# Patient Record
Sex: FEMALE | Race: WHITE | NOT HISPANIC OR LATINO | Employment: FULL TIME | URBAN - METROPOLITAN AREA
[De-identification: names, ages, dates, MRNs, and addresses within clinical notes are randomized per-mention and may not be internally consistent; named-entity substitution may affect disease eponyms.]

---

## 2024-07-02 ENCOUNTER — OFFICE VISIT (OUTPATIENT)
Dept: OBSTETRICS AND GYNECOLOGY | Facility: CLINIC | Age: 34
End: 2024-07-02
Payer: COMMERCIAL

## 2024-07-02 VITALS — WEIGHT: 133 LBS | HEART RATE: 71 BPM | SYSTOLIC BLOOD PRESSURE: 106 MMHG | DIASTOLIC BLOOD PRESSURE: 65 MMHG

## 2024-07-02 DIAGNOSIS — Z11.3 SCREEN FOR STD (SEXUALLY TRANSMITTED DISEASE): Primary | ICD-10-CM

## 2024-07-02 DIAGNOSIS — N89.8 VAGINA ITCHING: ICD-10-CM

## 2024-07-02 RX ORDER — LEVONORGESTREL 19.5 MG/1
1 INTRAUTERINE DEVICE INTRAUTERINE ONCE
COMMUNITY

## 2024-07-02 RX ORDER — DOXYCYCLINE HYCLATE 100 MG
100 TABLET ORAL 2 TIMES DAILY
Qty: 14 TABLET | Refills: 0 | Status: SHIPPED | OUTPATIENT
Start: 2024-07-02 | End: 2024-07-16

## 2024-07-02 NOTE — PROGRESS NOTES
S a 34-year-old female  0 who had an IUD put in 3 months ago had almost 3 months of bleeding till does not feel good and has irritability in her vagina she does not feel she had been exposed STDs she was the IUD put in was very painful she has a Anuradha.  She said she could not tolerate birth control pills felt real bad on she thinks she possibly has a intolerance to progesterone.  Discussed all kind of possibilities she has not 100% sure she would then want any children if she came to that conclusion of the tubal would be an excellent choice for now we are going to do a 1 culture put her on doxycycline 100 b.i.d. on examination vulva is normal is a very is a somewhat difficult exam due to the vagina being fairly tight does have some old blood in the vagina cultures were taken bimanual was essentially normal did spend least 30 minutes with the patient face-to-face

## 2024-07-05 LAB
BACTERIAL VAGINOSIS: POSITIVE
CANDIDA GLABRATA: NEGATIVE
CANDIDA SPECIES: NEGATIVE
CHLAMYDIA: NEGATIVE
GONORRHEA: NEGATIVE
SOURCE: NORMAL
TRICHOMONAS VAGINALIS: NEGATIVE

## 2024-07-08 DIAGNOSIS — B96.89 BACTERIAL VAGINOSIS: Primary | ICD-10-CM

## 2024-07-08 DIAGNOSIS — N76.0 BACTERIAL VAGINOSIS: Primary | ICD-10-CM

## 2024-07-08 RX ORDER — METRONIDAZOLE 500 MG/1
500 TABLET ORAL 2 TIMES DAILY
Qty: 14 TABLET | Refills: 0 | Status: CANCELLED | OUTPATIENT
Start: 2024-07-08 | End: 2024-07-15

## 2024-07-08 NOTE — TELEPHONE ENCOUNTER
Pt took doxycycline, states symptoms have worsened. Pt positive for BV, flagyl abx sent to pharmacy. Advised to stop doxycyline. Pt v/u.

## 2024-07-08 NOTE — TELEPHONE ENCOUNTER
----- Message from Caitlin Espinoza sent at 7/8/2024  9:23 AM CDT -----  Type:  Needs Medical Advice    Who Called: Stefanie Loomis    Symptoms (please be specific): -   How long has patient had these symptoms:  -  Pharmacy name and phone #:  -  Would the patient rather a call back or a response via [facility]?    Best Call Back Number: [phone]    Additional Information: pt needs to discuss results w/ Dr Costa when he is available ( pt says her symptoms have gotten worse and pharmacy only gave her 1 wk of the medication

## 2024-07-17 ENCOUNTER — TELEPHONE (OUTPATIENT)
Dept: OBSTETRICS AND GYNECOLOGY | Facility: CLINIC | Age: 34
End: 2024-07-17
Payer: COMMERCIAL

## 2024-07-17 DIAGNOSIS — B37.9 YEAST INFECTION: Primary | ICD-10-CM

## 2024-07-17 RX ORDER — FLUCONAZOLE 150 MG/1
150 TABLET ORAL DAILY
Qty: 2 TABLET | Refills: 0 | Status: SHIPPED | OUTPATIENT
Start: 2024-07-17 | End: 2024-07-19

## 2024-07-17 NOTE — TELEPHONE ENCOUNTER
Patient scheduled with  for 7/18/2024 @ 9:00am. Pt v/u      ----- Message from Jenny Yuen sent at 7/17/2024  8:05 AM CDT -----  Contact: Stefanie  Type:  Same Day Appointment Request    Caller is requesting a same day appointment.      Name of Caller:Stefanie  When is the first available appointment?7/22/24  Symptoms:discharge/symptoms returning/antibiotics  Best Call Back Number:[phone]   Additional Information: Patient request to be seen today. Please give patient an immediate call back to assist.    Type:  Needs Medical Advice    Who Called: Stefanie  Symptoms (please be specific): Discharge   How long has patient had these symptoms:  This morning  Pharmacy name and phone #:    [id number] - [place], [place] - [address] [address]  Phone: [phone] Fax: [phone]  Would the patient rather a call back or a response via [facility]? call  Best Call Back Number: [phone]   Additional Information: Patient reports symptoms returned this morning and request to receive antibiotics today.     Thank you,  GH

## 2024-07-17 NOTE — TELEPHONE ENCOUNTER
----- Message from Theron Portillo sent at 7/17/2024  8:19 AM CDT -----  Contact: Stefanie  Mandy called in she has an appointment scheduled for tomorrow 7/18/24 however, she is asking to be seen today 7/17/24. She is asking for a call back [phone]

## 2024-07-18 ENCOUNTER — OFFICE VISIT (OUTPATIENT)
Dept: OBSTETRICS AND GYNECOLOGY | Facility: CLINIC | Age: 34
End: 2024-07-18
Payer: COMMERCIAL

## 2024-07-18 VITALS — DIASTOLIC BLOOD PRESSURE: 76 MMHG | SYSTOLIC BLOOD PRESSURE: 124 MMHG | HEART RATE: 77 BPM | WEIGHT: 132.94 LBS

## 2024-07-18 DIAGNOSIS — N76.0 BACTERIAL VAGINOSIS: ICD-10-CM

## 2024-07-18 DIAGNOSIS — T83.9XXD COMPLICATION OF INTRAUTERINE DEVICE (IUD), UNSPECIFIED COMPLICATION, SUBSEQUENT ENCOUNTER: Primary | ICD-10-CM

## 2024-07-18 DIAGNOSIS — B96.89 BACTERIAL VAGINOSIS: ICD-10-CM

## 2024-07-18 PROCEDURE — 3074F SYST BP LT 130 MM HG: CPT | Mod: CPTII,S$GLB,, | Performed by: OBSTETRICS & GYNECOLOGY

## 2024-07-18 PROCEDURE — 99213 OFFICE O/P EST LOW 20 MIN: CPT | Mod: 25,S$GLB,, | Performed by: OBSTETRICS & GYNECOLOGY

## 2024-07-18 PROCEDURE — 58301 REMOVE INTRAUTERINE DEVICE: CPT | Mod: S$GLB,,, | Performed by: OBSTETRICS & GYNECOLOGY

## 2024-07-18 PROCEDURE — 3078F DIAST BP <80 MM HG: CPT | Mod: CPTII,S$GLB,, | Performed by: OBSTETRICS & GYNECOLOGY

## 2024-07-18 RX ORDER — METRONIDAZOLE 500 MG/1
500 TABLET ORAL 2 TIMES DAILY
Qty: 14 TABLET | Refills: 0 | Status: SHIPPED | OUTPATIENT
Start: 2024-07-18 | End: 2024-08-01

## 2024-07-18 NOTE — PROGRESS NOTES
S a 34-year-old female who had an IUD put in several months ago ever since she had the IUD in she has had multiple problems different discharge is watery discharge foul-smelling discharge pain with the IUD will treat her with doxycycline cephalexin, which has helped now she is having more of a watery discharge I advised her that maybe she just is allergic to something in the IUD therefore the IUD was removed she had moderate cramping afterwards she did real well with metronidazole so will try her on that for little while and do a 1 culture the IUD was removed intact there was no odor watery discharge or discharge from the uterus.

## 2024-07-18 NOTE — PROCEDURES
Removal of IUD    Date/Time: 7/18/2024 1:00 PM    Performed by: Luther Costa MD  Authorized by: Luther Costa MD    Consent given by:  Patient  Procedure risks and benefits discussed: yes    Patient questions answered: yes    Patient agrees, verbalizes understanding, and wants to proceed: yes    Educational handouts given: no    Instructions and paperwork completed: yes    Implant grasped by: forceps  Removal due to infection and inflammatory reaction: yes    Removal due to mechanical complications of Nexplanon: yes    Removed with no complications: yes     IUD removed intact shown the patientno

## 2024-07-25 ENCOUNTER — TELEPHONE (OUTPATIENT)
Dept: OBSTETRICS AND GYNECOLOGY | Facility: CLINIC | Age: 34
End: 2024-07-25
Payer: COMMERCIAL

## 2024-07-25 NOTE — TELEPHONE ENCOUNTER
----- Message from Chayo Wilson sent at 7/24/2024  4:08 PM CDT -----  Contact: GAGANDEEP GUTIERREZ [[id number]]  ..Type:  Patient Requesting Call    Who Called: GAGANDEEP GUTIERREZ [[id number]]  Does the patient know what this is regarding?: 7/19 test results  Would the patient rather a call back or a response via [facility]?  call  Best Call Back Number: .[phone] (home)   Additional Information:

## 2024-07-29 ENCOUNTER — DOCUMENTATION ONLY (OUTPATIENT)
Dept: OBSTETRICS AND GYNECOLOGY | Facility: CLINIC | Age: 34
End: 2024-07-29
Payer: COMMERCIAL

## 2024-07-29 NOTE — PROGRESS NOTES
Discussed results of the 1 swab with the patient she had an IUD that was removed we did grow out mycoplasma and the recommendation she was already treated with doxycycline 100 b.i.d. p.o. b.i.d. for 7 days and will also treat her with the low lactobacilli protocol with vaginal on oral probiotics

## 2024-08-19 ENCOUNTER — TELEPHONE (OUTPATIENT)
Dept: OBSTETRICS AND GYNECOLOGY | Facility: CLINIC | Age: 34
End: 2024-08-19
Payer: COMMERCIAL

## 2024-08-19 ENCOUNTER — PATIENT MESSAGE (OUTPATIENT)
Dept: OBSTETRICS AND GYNECOLOGY | Facility: CLINIC | Age: 34
End: 2024-08-19
Payer: COMMERCIAL

## 2024-08-19 NOTE — TELEPHONE ENCOUNTER
Patient reports she is not feeling well after her IUD was removed. Patient is requesting blood work. Informed patient Dr. Costa is not here today. Informed patient if she needs to be evaluated to report to urgent care or the ER. Verbalized understanding.

## 2024-08-19 NOTE — TELEPHONE ENCOUNTER
----- Message from Thiago Talamantes sent at 8/19/2024 11:50 AM CDT -----  Contact: [phone]  Type:  Same Day Appointment Request    Caller is requesting a same day appointment.  Caller declined first available appointment listed below.    Name of Caller:Stefanie   When is the first available appointment?08/21/2024  Symptoms:feeling ill since birth control was removed   Best Call Back Number:[phone]   Additional Information: pt is requesting to be seen today

## 2024-08-21 ENCOUNTER — OFFICE VISIT (OUTPATIENT)
Dept: OBSTETRICS AND GYNECOLOGY | Facility: CLINIC | Age: 34
End: 2024-08-21
Payer: COMMERCIAL

## 2024-08-21 VITALS — WEIGHT: 141 LBS | HEART RATE: 83 BPM | DIASTOLIC BLOOD PRESSURE: 77 MMHG | SYSTOLIC BLOOD PRESSURE: 121 MMHG

## 2024-08-21 DIAGNOSIS — Z87.19 H/O DIVERTICULITIS OF COLON: ICD-10-CM

## 2024-08-21 DIAGNOSIS — N72 CERVICITIS: Primary | ICD-10-CM

## 2024-08-21 DIAGNOSIS — R58 BLEEDING: ICD-10-CM

## 2024-08-21 DIAGNOSIS — R10.9 ABDOMINAL PAIN, UNSPECIFIED ABDOMINAL LOCATION: ICD-10-CM

## 2024-08-21 LAB
ABS NRBC COUNT: 0 X 10 3/UL (ref 0–0.01)
ABSOLUTE BASOPHIL: 0.05 X 10 3/UL (ref 0–0.22)
ABSOLUTE EOSINOPHIL: 0.1 X 10 3/UL (ref 0.04–0.54)
ABSOLUTE IMMATURE GRAN: 0.02 X 10 3/UL (ref 0–0.04)
ABSOLUTE LYMPHOCYTE: 2.51 X 10 3/UL (ref 0.86–4.75)
ABSOLUTE MONOCYTE: 0.7 X 10 3/UL (ref 0.22–1.08)
BASOPHILS NFR BLD: 0.9 % (ref 0.2–1.2)
EOSINOPHIL NFR BLD: 1.7 % (ref 0.7–7)
HCT VFR BLD AUTO: 39.8 % (ref 37–47)
HGB BLD-MCNC: 13.4 G/DL (ref 12–16)
IMMATURE GRANULOCYTES: 0.3 % (ref 0–0.5)
LYMPHOCYTES NFR BLD: 42.8 % (ref 19.3–53.1)
MCH RBC QN AUTO: 32.4 PG (ref 27–32)
MCHC RBC AUTO-ENTMCNC: 33.7 G/DL (ref 32–36)
MCV RBC AUTO: 96.1 FL (ref 82–100)
MONOCYTES NFR BLD: 11.9 % (ref 4.7–12.5)
NEUTROPHILS # BLD AUTO: 2.48 X 10 3/UL (ref 2.15–7.56)
NEUTROPHILS NFR BLD: 42.4 % (ref 34–71.1)
NUCLEATED RED BLOOD CELLS: 0 /100 WBC (ref 0–0.2)
PLATELET # BLD AUTO: 363 X 10 3/UL (ref 135–400)
RBC # BLD AUTO: 4.14 X 10 6/UL (ref 4.2–5.4)
RDW-SD: 47 FL (ref 37–54)
WBC # BLD: 5.86 X 10 3/UL (ref 4.3–10.8)

## 2024-08-21 NOTE — PROGRESS NOTES
Is a 34-year-old female who is feeling bad having some bloating and pain was sure whether was IUD is a lady so the IUD is removed subsequently lot of diarrhea abdominal pain she does have a history diverticulitis had put her on the doxycycline on exam today her abdomen not bloating at this time pelvic and is no discharge culture was taken some bimanual examination uterus is not tender X is not tender clinical does not feel like salpingitis her endometritis sounds like some type Rosmery bowel diverticulitis see if I get her in the see gastroenterologist I will do a CBC and a culture see if we to.  The patient had pregnancy test which was negative

## 2024-08-22 DIAGNOSIS — Z87.19 HX OF DIVERTICULITIS OF COLON: Primary | ICD-10-CM

## 2024-08-23 LAB — CULTURE: NORMAL

## 2024-09-10 ENCOUNTER — TELEPHONE (OUTPATIENT)
Dept: OBSTETRICS AND GYNECOLOGY | Facility: CLINIC | Age: 34
End: 2024-09-10
Payer: COMMERCIAL

## 2024-09-10 NOTE — TELEPHONE ENCOUNTER
Called patient back to follow up with her after her ER visit. She is going to see a GI physician for her abdominal pain, she is not currently having any GYN issues and will call if she does.           Lien

## 2024-11-01 ENCOUNTER — TELEPHONE (OUTPATIENT)
Dept: OBSTETRICS AND GYNECOLOGY | Facility: CLINIC | Age: 34
End: 2024-11-01
Payer: COMMERCIAL

## 2024-11-04 DIAGNOSIS — Z30.9 ENCOUNTER FOR CONTRACEPTIVE MANAGEMENT, UNSPECIFIED TYPE: Primary | ICD-10-CM

## 2024-11-05 RX ORDER — NORETHINDRONE ACETATE AND ETHINYL ESTRADIOL, ETHINYL ESTRADIOL AND FERROUS FUMARATE 1MG-10(24)
1 KIT ORAL DAILY
Qty: 90 TABLET | Refills: 3 | Status: SHIPPED | OUTPATIENT
Start: 2024-11-05 | End: 2025-11-05

## 2024-11-09 ENCOUNTER — OUTSIDE PLACE OF SERVICE (OUTPATIENT)
Dept: GASTROENTEROLOGY | Facility: CLINIC | Age: 34
End: 2024-11-09
Payer: COMMERCIAL

## 2024-11-10 ENCOUNTER — OUTSIDE PLACE OF SERVICE (OUTPATIENT)
Dept: GASTROENTEROLOGY | Facility: CLINIC | Age: 34
End: 2024-11-10
Payer: COMMERCIAL

## 2024-12-23 ENCOUNTER — TELEPHONE (OUTPATIENT)
Dept: GASTROENTEROLOGY | Facility: CLINIC | Age: 34
End: 2024-12-23
Payer: COMMERCIAL

## 2025-02-26 ENCOUNTER — TELEPHONE (OUTPATIENT)
Dept: GASTROENTEROLOGY | Facility: CLINIC | Age: 35
End: 2025-02-26
Payer: COMMERCIAL

## 2025-02-26 NOTE — TELEPHONE ENCOUNTER
----- Message from Gifty sent at 2/26/2025  1:33 PM CST -----  Contact: GAGANDEEP GUTIERREZ [[id number]]  ...Type:  Patient Requesting OrdersWho Called: GAGANDEEP GUTIERREZ [[id number]]What order(s)is the call regarding?: the pt needs the results faxed over to there pcp of the gastric emptying. Dr. Ward office number: [phone].Would the patient rather a call back or a response via [facility]?  [facility] Call Back Number: [phone] (home) Additional Information:

## 2025-02-26 NOTE — TELEPHONE ENCOUNTER
S/W pt she is wanting her gastric emptying results sent to Dr. Ward office number: [phone]. Informed pt that we have not received the results from her gastric emptying study and will give her a call when it is received and we will fax it over to her requested provider. -MARII

## 2025-03-03 ENCOUNTER — TELEPHONE (OUTPATIENT)
Dept: GASTROENTEROLOGY | Facility: CLINIC | Age: 35
End: 2025-03-03
Payer: COMMERCIAL

## 2025-03-03 NOTE — TELEPHONE ENCOUNTER
See notes from 3/3/2025 and 2/26/2025 Telephone encounters. This is a patient of Dr. Dobson's (I only saw her as a consultant when she was in the hospital). I did not order a GES which is why we are not contacting her with those results. She needs to reach out to the provider who ordered the GES.  AGNES

## 2025-03-03 NOTE — TELEPHONE ENCOUNTER
----- Message from Theron sent at 3/3/2025  2:40 PM CST -----  Contact: Stefanie  Type:  Test ResultsWho Called: Stefanie Name of Test (Lab/Mammo/Etc): Gastric Emptying Date of Test: 2/12/25Ordering Provider:  Where the test was performed: [facility] Would the patient rather a call back or a response via [facility]? Call back Best Call Back Number: [phone] Information:  Pt states that she have called in before about her results.

## 2025-03-03 NOTE — TELEPHONE ENCOUNTER
Called patient and let her know her gastro emptying results were not in yet. She was not happy, I told her I would call her when results are in. -ABO

## 2025-03-04 NOTE — TELEPHONE ENCOUNTER
Called patient to inform her per NBP that she did not order the GES and only saw patient as consult in the hospital and she needs to reach out to the provider who ordered the GES. Patient verbalized understanding of this information. -KNC,LPN

## 2025-05-01 ENCOUNTER — TELEPHONE (OUTPATIENT)
Dept: OBSTETRICS AND GYNECOLOGY | Facility: CLINIC | Age: 35
End: 2025-05-01
Payer: COMMERCIAL

## 2025-05-01 NOTE — TELEPHONE ENCOUNTER
Returned patient's call she is wanting to get allergy testing done asked if the provider does this I explained to her that she would have to go to an allergist or ENT. For medication, food and environmental.       Shantell-CMA

## 2025-05-01 NOTE — TELEPHONE ENCOUNTER
----- Message from Caitlin sent at 5/1/2025  8:21 AM CDT -----  Type:  Needs Medical AdviceWho Called: Stefanie Zelaya (please be specific): - How long has patient had these symptoms:  -Pharmacy name and phone #:  -Would the patient rather a call back or a response via [facility]? Be Call Back Number: [phone] Information: pt wants to know if   does allergy testing, please advise

## 2025-05-06 ENCOUNTER — OFFICE VISIT (OUTPATIENT)
Dept: OBSTETRICS AND GYNECOLOGY | Facility: CLINIC | Age: 35
End: 2025-05-06
Payer: COMMERCIAL

## 2025-05-06 VITALS
HEART RATE: 87 BPM | HEIGHT: 66 IN | SYSTOLIC BLOOD PRESSURE: 132 MMHG | BODY MASS INDEX: 20.76 KG/M2 | DIASTOLIC BLOOD PRESSURE: 64 MMHG | WEIGHT: 129.19 LBS

## 2025-05-06 DIAGNOSIS — N89.8 VAGINA ITCHING: ICD-10-CM

## 2025-05-06 DIAGNOSIS — G47.00 INSOMNIA, UNSPECIFIED TYPE: Primary | ICD-10-CM

## 2025-05-06 PROCEDURE — 99213 OFFICE O/P EST LOW 20 MIN: CPT | Mod: S$PBB,,, | Performed by: OBSTETRICS & GYNECOLOGY

## 2025-05-06 PROCEDURE — 3075F SYST BP GE 130 - 139MM HG: CPT | Mod: CPTII,,, | Performed by: OBSTETRICS & GYNECOLOGY

## 2025-05-06 PROCEDURE — 3008F BODY MASS INDEX DOCD: CPT | Mod: CPTII,,, | Performed by: OBSTETRICS & GYNECOLOGY

## 2025-05-06 PROCEDURE — 3078F DIAST BP <80 MM HG: CPT | Mod: CPTII,,, | Performed by: OBSTETRICS & GYNECOLOGY

## 2025-05-06 PROCEDURE — 1159F MED LIST DOCD IN RCRD: CPT | Mod: CPTII,,, | Performed by: OBSTETRICS & GYNECOLOGY

## 2025-05-06 RX ORDER — NYSTATIN AND TRIAMCINOLONE ACETONIDE 100000; 1 [USP'U]/G; MG/G
CREAM TOPICAL
Qty: 30 G | Refills: 1 | Status: SHIPPED | OUTPATIENT
Start: 2025-05-06 | End: 2026-05-06

## 2025-05-06 RX ORDER — ZOLPIDEM TARTRATE 5 MG/1
5 TABLET ORAL NIGHTLY PRN
Qty: 30 TABLET | Refills: 4 | Status: SHIPPED | OUTPATIENT
Start: 2025-05-06 | End: 2025-11-04

## 2025-05-06 RX ORDER — NYSTATIN AND TRIAMCINOLONE ACETONIDE 100000; 1 [USP'U]/G; MG/G
CREAM TOPICAL
Qty: 30 G | Refills: 1 | Status: SHIPPED | OUTPATIENT
Start: 2025-05-06 | End: 2025-05-06

## 2025-05-06 RX ORDER — DROSPIRENONE AND ETHINYL ESTRADIOL 0.03MG-3MG
1 KIT ORAL DAILY
Qty: 28 TABLET | Refills: 11 | Status: SHIPPED | OUTPATIENT
Start: 2025-05-06 | End: 2025-05-06

## 2025-05-06 RX ORDER — NORETHINDRONE ACETATE AND ETHINYL ESTRADIOL 1MG-20(21)
1 KIT ORAL DAILY
Qty: 30 TABLET | Refills: 11 | Status: SHIPPED | OUTPATIENT
Start: 2025-05-06 | End: 2026-05-06

## 2025-05-06 NOTE — PROGRESS NOTES
S a 35-year-old female that has heavy periods also has a periods every 21 days.  She has an appointment with allergist to see if she is allergic to her hormones she took birth control pill before and apparently had a reaction to it was going to try a natural progesterone pill vital off natural progesterone is any birth control pill therefore and will try her on Loestrin periods she has a an IUD which was removed due to problems she has some vaginal itching at this time on examination the vulva has a few little tiny red areas could be just irritation from her underwear periods vaginal examination is a little bit dry does have little white discharge 1 swab was taken put her on some Mycolog for the itching and will try her on Loestrin again

## 2025-05-09 ENCOUNTER — TELEPHONE (OUTPATIENT)
Dept: OBSTETRICS AND GYNECOLOGY | Facility: CLINIC | Age: 35
End: 2025-05-09
Payer: COMMERCIAL

## 2025-05-09 NOTE — TELEPHONE ENCOUNTER
----- Message from Brigitte sent at 5/9/2025  1:07 PM CDT -----  Regarding: Results  Contact: patient  Type:  Patient Returning CallWho Called:Stefanie Who Left Message for Patient: Stefanie Does the patient know what this is regarding?: results for swap test Would the patient rather a call back or a response via [facility]?  Call back Best Call Back Number: [phone] (home) Additional Information: Please send to [facility] and rk the the AD Shelby

## 2025-05-12 ENCOUNTER — TELEPHONE (OUTPATIENT)
Dept: OBSTETRICS AND GYNECOLOGY | Facility: CLINIC | Age: 35
End: 2025-05-12
Payer: COMMERCIAL

## 2025-05-12 NOTE — TELEPHONE ENCOUNTER
----- Message from Shamika sent at 5/12/2025 12:55 PM CDT -----  Contact: self  Patient is requesting a call back regarding results. Please call back at [phone]

## 2025-05-12 NOTE — TELEPHONE ENCOUNTER
Spoke to pt an let her know that the results wasn't in yet an we would contact her when they would be. She stated that the cream he prescribed her is making it worse an her symptoms are burning sensation , I told her if it was making it worse don't continue to use it. Dr. KENDALL ren his nurse will be in tomorrow an will call her.

## 2025-05-13 ENCOUNTER — OFFICE VISIT (OUTPATIENT)
Dept: OBSTETRICS AND GYNECOLOGY | Facility: CLINIC | Age: 35
End: 2025-05-13
Payer: COMMERCIAL

## 2025-05-13 VITALS — BODY MASS INDEX: 20.85 KG/M2 | HEIGHT: 66 IN

## 2025-05-13 DIAGNOSIS — N89.8 LEUKORRHEA: ICD-10-CM

## 2025-05-13 DIAGNOSIS — N89.8 VAGINA ITCHING: Primary | ICD-10-CM

## 2025-05-13 PROCEDURE — 99212 OFFICE O/P EST SF 10 MIN: CPT | Mod: S$PBB,,, | Performed by: OBSTETRICS & GYNECOLOGY

## 2025-05-13 PROCEDURE — 3008F BODY MASS INDEX DOCD: CPT | Mod: CPTII,,, | Performed by: OBSTETRICS & GYNECOLOGY

## 2025-05-13 PROCEDURE — 1159F MED LIST DOCD IN RCRD: CPT | Mod: CPTII,,, | Performed by: OBSTETRICS & GYNECOLOGY

## 2025-05-13 RX ORDER — TERCONAZOLE 4 MG/G
CREAM VAGINAL
COMMUNITY
Start: 2025-01-25

## 2025-05-13 RX ORDER — PROGESTERONE 100 MG/1
CAPSULE ORAL
COMMUNITY
Start: 2025-03-11

## 2025-05-13 RX ORDER — LEVOTHYROXINE, LIOTHYRONINE 19; 4.5 UG/1; UG/1
TABLET ORAL
COMMUNITY
Start: 2025-02-19

## 2025-05-13 RX ORDER — TRAMADOL HYDROCHLORIDE 50 MG/1
TABLET, FILM COATED ORAL
COMMUNITY
Start: 2025-01-31

## 2025-05-13 RX ORDER — NORETHINDRONE ACETATE AND ETHINYL ESTRADIOL 1MG-20(21)
1 KIT ORAL DAILY
COMMUNITY
Start: 2024-11-14

## 2025-05-13 RX ORDER — NORETHINDRONE ACETATE AND ETHINYL ESTRADIOL, ETHINYL ESTRADIOL AND FERROUS FUMARATE 1MG-10(24)
KIT ORAL
COMMUNITY
Start: 2024-10-29

## 2025-05-13 RX ORDER — SUCRALFATE 1 G/10ML
SUSPENSION ORAL
COMMUNITY
Start: 2024-11-27

## 2025-05-13 NOTE — PROGRESS NOTES
S a 35-year-old female who has a chronic discharge with some itching the tried multiple regimens all her she states she would get back on birth control pills have irregular bleeding and it was trigger the infection and she get off of them.  She states this all began after COVID vaccine periods a recently did a 1 swab on her recommendations due to the fact that there was no BV associated over lactobacilli detected that could be a poor sample so re-did the pH today and that was right at 3.6 which could lactobacillus overgrowth therefore were going to try baking soda vaginal suppositories 125 mg once a night

## 2025-05-21 ENCOUNTER — TELEPHONE (OUTPATIENT)
Dept: OBSTETRICS AND GYNECOLOGY | Facility: CLINIC | Age: 35
End: 2025-05-21
Payer: COMMERCIAL

## 2025-05-21 RX ORDER — FLUCONAZOLE 150 MG/1
150 TABLET ORAL DAILY
Qty: 2 TABLET | Refills: 0 | Status: SHIPPED | OUTPATIENT
Start: 2025-05-21 | End: 2025-05-23

## 2025-05-21 RX ORDER — TINIDAZOLE 500 MG/1
2 TABLET ORAL ONCE
Qty: 24 TABLET | Refills: 1 | Status: SHIPPED | OUTPATIENT
Start: 2025-05-21 | End: 2025-05-21

## 2025-05-21 RX ORDER — LEVONORGESTREL AND ETHINYL ESTRADIOL 0.1-0.02MG
1 KIT ORAL DAILY
Qty: 84 TABLET | Refills: 3 | Status: SHIPPED | OUTPATIENT
Start: 2025-05-21 | End: 2026-05-20

## 2025-05-21 NOTE — TELEPHONE ENCOUNTER
Discussed results of patient's 1 swab she has no good bacteria does have yeast she is having irregular bleeding again and will put her back on bowel culture she has on fast 4 days left of her vaginal probiotics and were put her on tinidazol 2 g for 2 days every 2 weeks for 3 doses 1 Diflucan and will see how she does

## 2025-05-22 ENCOUNTER — TELEPHONE (OUTPATIENT)
Dept: OBSTETRICS AND GYNECOLOGY | Facility: CLINIC | Age: 35
End: 2025-05-22
Payer: COMMERCIAL

## 2025-05-22 NOTE — TELEPHONE ENCOUNTER
Returned patient's call, she is calling for clarification on the Tindamax gave instructions patient acknowledged.       ShantellBRYCE

## 2025-05-23 ENCOUNTER — NURSE TRIAGE (OUTPATIENT)
Dept: ADMINISTRATIVE | Facility: CLINIC | Age: 35
End: 2025-05-23
Payer: COMMERCIAL

## 2025-05-23 NOTE — TELEPHONE ENCOUNTER
Pt c/o watery vaginal discharge with itching. Currently treating with abx, probiotics, and suppositories as prescribed by Dr. Costa. Advised that she can use monistat 7 for itching, continue treatment regimen. Enc'd to call back if symptoms worsen, Dr. Costa to be back in office on 5/27/25, pt v/u.

## 2025-05-23 NOTE — TELEPHONE ENCOUNTER
Patient being treated by OB/GYN for discharge. States she is following care advice but woke up this am with a watery discharge. Denies any odor or discomfort. Did wake up with itching. Discharge is watery and clear. Triage done- dispo see provider in 3 days. Advised I could not make appointment with OB but could send message. Advised to also message via [facility]. Verb understanding.   Reason for Disposition   Symptoms of a yeast infection' (i.e., itchy, white discharge, not bad smelling) and not improved > 3 days following Care Advice    Additional Information   Negative: SEVERE abdominal pain (e.g., excruciating)   Negative: Patient sounds very sick or weak to the triager   Negative: Yellow or green vaginal discharge and has a fever   Negative: Constant abdominal pain lasting > 2 hours   Negative: Mild lower abdominal pain comes and goes (cramps) that lasts > 24 hours   Negative: Genital area looks infected (e.g., draining sore, spreading redness)   Negative: Rash is tiny water blisters (3 or more)   Negative: Patient wants to be seen   Negative: Rash (e.g., redness, tiny bumps, sore) of genital area present > 24 hours   Negative: Bad smelling vaginal discharge   Negative: Abnormal color vaginal discharge (i.e., yellow, green, gray)    Protocols used: Vaginal Wcjyfjefp-T-EO

## 2025-05-27 ENCOUNTER — TELEPHONE (OUTPATIENT)
Dept: OBSTETRICS AND GYNECOLOGY | Facility: CLINIC | Age: 35
End: 2025-05-27
Payer: COMMERCIAL

## 2025-05-27 NOTE — TELEPHONE ENCOUNTER
Copied from CRM #[id number]. Topic: General Inquiry - Patient Advice  >> May 27, 2025  1:51 PM Thiago wrote:  Call back

## 2025-05-27 NOTE — TELEPHONE ENCOUNTER
Returned patient's call she is feeling better and wanted to know how long to continue probiotics, told her this is a long term thing since she does not have very much good bacteria per one swab.  Patient acknowledged. And will call if any other problems arise,       [facility]